# Patient Record
Sex: FEMALE | Race: OTHER
[De-identification: names, ages, dates, MRNs, and addresses within clinical notes are randomized per-mention and may not be internally consistent; named-entity substitution may affect disease eponyms.]

---

## 2017-04-25 ENCOUNTER — HOSPITAL ENCOUNTER (EMERGENCY)
Dept: HOSPITAL 62 - ER | Age: 27
Discharge: HOME | End: 2017-04-25
Payer: COMMERCIAL

## 2017-04-25 VITALS — SYSTOLIC BLOOD PRESSURE: 147 MMHG | DIASTOLIC BLOOD PRESSURE: 93 MMHG

## 2017-04-25 DIAGNOSIS — K02.9: ICD-10-CM

## 2017-04-25 DIAGNOSIS — Z87.891: ICD-10-CM

## 2017-04-25 DIAGNOSIS — K04.7: Primary | ICD-10-CM

## 2017-04-25 DIAGNOSIS — J45.909: ICD-10-CM

## 2017-04-25 PROCEDURE — 99282 EMERGENCY DEPT VISIT SF MDM: CPT

## 2017-04-25 NOTE — ER DOCUMENT REPORT
HPI





- HPI


Patient complains to provider of: TOOTHACHE WITH PUS


Onset: Other - COUPLE OF DAYS


Quality of pain: Throbbing


Severity: Moderate


Pain Level: 4


Context: 


Patient has a decayed tooth to right lower jaw that has been draining since 

yesterday.  Pain and swelling started a couple of days ago.


Associated Symptoms: None


Exacerbated by: Food


Relieved by: Denies


Similar symptoms previously: Yes


Recently seen / treated by doctor: No





- ROS


ROS below otherwise negative: Yes


Systems Reviewed and Negative: Yes All other systems reviewed and negative





- CONSTITUTIONAL


Constitutional: DENIES: Fever





- EENT


EENT: DENIES: Congestion





- NEURO


Neurology: DENIES: Headache





- CARDIOVASCULAR


Cardiovascular: DENIES: Chest pain





- RESPIRATORY


Respiratory: DENIES: Trouble Breathing





- GASTROINTESTINAL


Gastrointestinal: DENIES: Abdominal Pain





- URINARY


Urinary: DENIES: Dysuria





- REPRODUCTIVE


Reproductive: DENIES: Pregnant:





- MUSCULOSKELETAL


Musculoskeletal: DENIES: Extremity pain





- DERM


Skin Color: Normal


Skin Problems: None





Past Medical History





- General


Information source: Patient





- Social History


Smoking Status: Former Smoker


Chew tobacco use (# tins/day): No


Frequency of alcohol use: Social


Drug Abuse: None


Lives with: Family


Family History: Reviewed & Not Pertinent


Patient has suicidal ideation: No


Patient has homicidal ideation: No





- Medical History


Medical History: Negative


Pulmonary Medical History: Reports: Hx Asthma, Hx Pneumonia - x2


Renal/ Medical History: Denies: Hx Peritoneal Dialysis


Surgical Hx: Negative


Past Surgical History: Reports: Hx  Section





- Immunizations


Immunizations up to date: Yes





Vertical Provider Document





- CONSTITUTIONAL


Agree With Documented VS: Yes


Exam Limitations: No Limitations


General Appearance: WD/WN, Mild Distress





- INFECTION CONTROL


TRAVEL OUTSIDE OF THE [place] IN LAST 30 DAYS: No





- HEENT


HEENT: Atraumatic.  negative: Pharyngeal Erythema


Mouth Diagram: 


  __________________________














  __________________________





 1 - DECAY WITH MILD GUM SWELLING





Notes: 


Patient has mild edema to right lower trauma.  No erythema.





- NECK


Neck: Normal Inspection, Supple





- RESPIRATORY


Respiratory: Breath Sounds Normal, No Respiratory Distress


O2 Sat by Pulse Oximetry: 97





- CARDIOVASCULAR


Cardiovascular: Regular Rate, Regular Rhythm





- MUSCULOSKELETAL/EXTREMETIES


Musculoskeletal/Extremeties: MAEW, FROM





- NEURO


Level of Consciousness: Awake, Alert, Appropriate





- DERM


Integumentary: Warm, Dry, No Rash





Course





- Vital Signs


Vital signs: 


 











Temp Pulse Resp BP Pulse Ox


 


 99.2 F   58 L  16   147/93 H  97 


 


 17 13:08  17 13:08  17 13:08  17 13:08  17 13:08














Discharge





- Discharge


Clinical Impression: 


 Dental abscess





Condition: Good


Disposition: HOME, SELF-CARE


Instructions:  Clindamycin ([facility]), Oral Narcotic Medication ([facility]), Toothache ([facility]

), Abscess ([facility])


Additional Instructions: 


Ibuprofen 3 times a day as needed for pain


You must follow-up with dentist for further evaluation of dental problems


Return as needed


Prescriptions: 


Clindamycin HCl 150 mg PO QID #40 capsule


Hydrocodone/Acetaminophen [Norco 5-325 mg Tablet] 1 tab PO PRN PRN #15 tablet


 PRN Reason: 


Referrals: 


KASHIF VILLARREAL MD [Primary Care Provider] - Follow up as needed

## 2018-09-29 ENCOUNTER — HOSPITAL ENCOUNTER (EMERGENCY)
Dept: HOSPITAL 62 - ER | Age: 28
Discharge: HOME | End: 2018-09-29
Payer: COMMERCIAL

## 2018-09-29 VITALS — DIASTOLIC BLOOD PRESSURE: 70 MMHG | SYSTOLIC BLOOD PRESSURE: 132 MMHG

## 2018-09-29 DIAGNOSIS — Z91.013: ICD-10-CM

## 2018-09-29 DIAGNOSIS — E66.9: ICD-10-CM

## 2018-09-29 DIAGNOSIS — K08.89: ICD-10-CM

## 2018-09-29 DIAGNOSIS — K04.7: Primary | ICD-10-CM

## 2018-09-29 DIAGNOSIS — K02.9: ICD-10-CM

## 2018-09-29 DIAGNOSIS — J45.909: ICD-10-CM

## 2018-09-29 PROCEDURE — 99282 EMERGENCY DEPT VISIT SF MDM: CPT

## 2018-09-29 NOTE — ER DOCUMENT REPORT
ED Oral Problem





- General


Chief Complaint: Mouth Problem


Stated Complaint: ABSCESS


Time Seen by Provider: 18 19:16


Mode of Arrival: Ambulatory


Information source: Patient


Notes: 





Patient is a 28-year-old obese female comes emergency room complaining of an 

abscess inside her mouth on the hard palate just above the front 2 teeth.  

Patient states she has a history of bad teeth and they are mostly in the front.

  And it just popped up through this morning.  It is uncomfortable and feels 

like pressure.  That she cannot keep her tongue from playing with it and it 

hurts.


TRAVEL OUTSIDE OF THE [place] IN LAST 30 DAYS: No





- HPI


Patient complains to provider of: Toothache


Onset: This morning


Severity: Moderate


Pain Level: 3


Context: Fractured tooth


Associated symptoms: Toothache


Relieved by: Nothing


Similar symptoms previously: Yes


Recently seen / treated by doctor/dentist: No





- Related Data


Allergies/Adverse Reactions: 


 





Shellfish * [Shellfish] Allergy (Mild, Verified 18 17:58)


 mouth itches


shrimp Allergy (Mild, Uncoded 18 17:58)


 











Past Medical History





- General


Information source: Patient, Relative





- Social History


Smoking Status: Never Smoker


Cigarette use (# per day): No


Chew tobacco use (# tins/day): No


Smoking Education Provided: No


Frequency of alcohol use: Occasional


Drug Abuse: None


Family History: Reviewed & Not Pertinent


Patient has suicidal ideation: No


Patient has homicidal ideation: No


Pulmonary Medical History: Reports: Hx Asthma, Hx Pneumonia - x2


Renal/ Medical History: Denies: Hx Peritoneal Dialysis


Past Surgical History: Reports: Hx  Section





- Immunizations


Immunizations up to date: Yes





Review of Systems





- Review of Systems


Constitutional: No symptoms reported


EENT: Dental problem


Cardiovascular: No symptoms reported


Respiratory: No symptoms reported


Gastrointestinal: No symptoms reported


Genitourinary: No symptoms reported


Female Genitourinary: No symptoms reported


Musculoskeletal: No symptoms reported


Skin: No symptoms reported


Hematologic/Lymphatic: No symptoms reported


Neurological/Psychological: No symptoms reported


-: Yes All other systems reviewed and negative





Physical Exam





- Vital signs


Vitals: 





 











Temp Pulse Resp BP Pulse Ox


 


 98.7 F   77   16   133/72 H  100 


 


 18 18:11  18 18:11  18 18:11  18 18:11  09/29/18 18:11











Interpretation: Hypertensive





- Notes


Notes: 





Patient is a well-nourished well-developed obese female no apparent distress.





- General


General appearance: Alert, Anxious





- HEENT


Head: Normocephalic, Atraumatic


Eyes: Normal


Mouth/Lips: Lesions, Other - Physical exam shows patient has an area that is 

just above the 2 front teeth hard palate.  Slightly back off of the teeth.  Is 

approximately 2 cm long by 1 cm thick by 1 cm wide.  It is on the top of the 

hard palate.  It appears it is very fluctuant to touch spongy almost.  There is 

no drainage with applied pressure.  Rest of the mouth shows multiple teeth in 

different stages of decay.  The 2 front teeth and talking about of the upper 

and they are already down to the gumline and are black in appearance.


Mucous membranes: Moist


Pharynx: Normal





- Respiratory


Respiratory status: No respiratory distress


Chest status: Nontender


Breath sounds: Normal


Chest palpation: Normal





- Cardiovascular


Rhythm: Regular


Heart sounds: Normal auscultation


Murmur: No





- Neurological


Neuro grossly intact: Yes


Cognition: Normal


Orientation: AAOx4


Walton Coma Scale Eye Opening: Spontaneous


Wal[place] Coma Scale Verbal: Oriented


Angle Coma Scale Motor: Obeys Commands


Wal[place] Coma Scale Total: 15


Speech: Normal





Course





- Re-evaluation


Re-evalutation: 





18 20:17


Originally was not sure if this was going to be just inflamed tissue or an 

abscess.  I was pleasantly surprised to see that when I injected with lidocaine 

that pus actually started coming out.  We worked on it for several minutes on 

multiple times opening with this tweezers and dark/pickups and we got it down 

to almost equal level with the hard palate.  Patient will continue to work on 

that.  She will use warm salt water swishes.  We will place her on clindamycin 

little pain medication for tonight I have instructed them to watch her closely 

if she has any problems with breathing or handling her saliva to return to ER 

once.  I really do not want this to happen since it is so anterior in the 

mouth.  She understands she has to go to a dentist as soon as possible because 

this is getting to the point of being very very bad medically as far as 

infection goes.





- Vital Signs


Vital signs: 





 











Temp Pulse Resp BP Pulse Ox


 


 98.7 F   77   16   133/72 H  100 


 


 18 18:11  18 18:11  18 18:11  18 18:11  18 18:11














Discharge





- Discharge


Clinical Impression: 


 Abscess of oral space, Abscess, dental





Disposition: HOME, SELF-CARE


Instructions:  Abscess ([facility]), Post Incision and Drainage, Oral Narcotic 

Medication ([facility]), Dental Infection or Abscess ([facility])


Additional Instructions: 


Home and rest.  Medications prescribed.  As we discussed warm salt water 

swishes.  Do these often.  Take all of the antibiotics.  Highly recommend a 

dental follow-up as soon as possible.  As we have discussed watch her closely 

tonight make sure her breathing is good and she is handling her secretions 

before she goes to bed.  Should you have any concerns or problems return to ER 

for recheck.


Prescriptions: 


Clindamycin HCl 300 mg PO Q6 #40 capsule


Hydrocodone/Acetaminophen [Norco 5-325 Tablet] 1 each PO ASDIR PRN #12 tablet


 PRN Reason: 


Referrals: 


KASHIF VILLARREAL MD [Primary Care Provider] - Follow up as needed